# Patient Record
Sex: FEMALE | Race: WHITE
[De-identification: names, ages, dates, MRNs, and addresses within clinical notes are randomized per-mention and may not be internally consistent; named-entity substitution may affect disease eponyms.]

---

## 2022-07-23 NOTE — DIAGNOSTIC IMAGING REPORT
EXAMINATION: Right ankle 3 views.



HISTORY: Ankle injury.



COMPARISON: None available.



FINDINGS: There are displaced bimalleolar ankle fractures with

disruption of the ankle mortise. There is severe soft tissue

swelling. Small heel spur is present.



IMPRESSION: Displaced bimalleolar ankle fractures with disruption

of the ankle mortise. 



Dictated by: 



  Dictated on workstation # [id number]

## 2022-07-23 NOTE — ED LOWER EXTREMITY
General


Stated Complaint:  R FOOT PAIN


Source:  patient


Exam Limitations:  no limitations


 (AIDA FAUST)





History of Present Illness


Date Seen by Provider:  Jul 23, 2022


Time Seen by Provider:  17:48


Initial Comments


Patient is a 21-year-old female who presents ED with with right lateral ankle 

pain.  1 hour ago she was attempting to put on underwear bearing weight on her 

left leg at the time when she lost her balance landing on her right leg causing 

inward rotation of her ankle.  She heard immediate pop.  Has not been able to 

bear weight.  Obvious swelling noted to right lateral ankle.  Neurovascular 

intact.  Able to move her toes.  She took Tylenol right before arrival.  No 

history of previous fracture.  Denies hitting her head, back pain, chest pain, 

cough or shortness of breath.


 (AIDA FAUST)





Allergies and Home Medications


Allergies


Coded Allergies:  


     No Known Drug Allergies (Unverified , 7/23/22)





Patient Home Medication List


Home Medication List Reviewed:  Yes


 (AIDA FAUST)


Albuterol Sulfate (Proair Hfa) 1 Puff Puff, 2 PUFF IH Q4H, (Reported)


   Entered as Reported by: ROBERTO REDMOND on 7/25/22 1206


Hydrocodone/Acetaminophen (Hydrocodone-Acetamin 5-325 mg) 5 Mg-325 Mg Tablet, 1 

TAB PO Q4H PRN for PAIN-MODERATE (5-7)


   Prescribed by: LATOSHA GRIFFITHS on 7/23/22 1855


[Zoloft] Unknown Strength , Unknown Dose, (Reported)


   Entered as Reported by: ROBERTO REDMOND on 7/25/22 1206





Review of Systems


Constitutional:  No chills, No diaphoresis


EENTM:  No hearing loss, No ear pain, No blurred vision, No double vision


Respiratory:  No cough, No dyspnea on exertion


Cardiovascular:  No chest pain


Gastrointestinal:  No abdominal pain, No diarrhea, No nausea, No vomiting


Genitourinary:  No decreased output, No discharge


Musculoskeletal:  No back pain; joint pain, joint swelling


Skin:  change in color (AIDA FAUST)





All Other Systems Reviewed


Negative Unless Noted:  Yes


 (AIDA FAUST)





Physical Exam


Vital Signs





Vital Signs - First Documented








 7/23/22





 17:45


 


Temp 36.6


 


Pulse 76


 


Resp 18


 


B/P (MAP) 128/93 (105)


 


Pulse Ox 97


 


O2 Delivery Room Air








 (MAXIMILIANO KING MD)


Vital Signs


Capillary Refill :  


 (AIDA FAUST)


Height, Weight, BMI


Height: '"


Weight: lbs. oz. kg;  BMI


Method:


General Appearance:  WD/WN, no apparent distress


HEENT:  PERRL/EOMI, normal ENT inspection, TMs normal, pharynx normal


Neck:  non-tender, full range of motion, supple, normal inspection


Cardiovascular:  regular rate, rhythm, no edema, no gallop, no JVD


Respiratory:  chest non-tender, lungs clear, normal breath sounds, no 

respiratory distress


Gastrointestinal:  normal bowel sounds, non tender, soft, no organomegaly, no 

pulsatile mass


Back:  normal inspection, no CVA tenderness


Hips:  bilateral hip non-tender, bilateral hip normal inspection, bilateral hip 

normal range of motion, bilateral hip no evidence of injury


Legs:  bilateral leg non-tender, bilateral leg normal range of motion, bilateral

 leg no evidence of injury, bilateral leg abrasions


Knees:  bilateral knee non-tender, bilateral knee normal inspection, bilateral 

knee normal range of motion, bilateral knee no evidence of injury


Ankles:  right ankle pain, right ankle soft tissue tenderness, right ankle 

swelling


Neurologic/Psychiatric:  CNs II-XII nml as tested, no motor/sensory deficits, 

alert, normal mood/affect, oriented x 3


Skin:  other (Swelling to right lateral ankle.) (AIDA FAUST)





Procedures/Interventions


Splinting and Joint Reduction :  


   Pre-Proc Neuro Vasc Exam:  normal


   Post-Proc Neuro Vasc Exam:  normal


   Pre-Procedure NV Exam:  Yes


   post joint reduction film:  joint reduced


Progress


Short posterior leg and sugar-tong bulky splint right ankle.  Neurovascular 

intact.


   Ordered:  Crutches


   Hand-Made Type:  orthoglass


   Splint Application:  Short Leg


 (AIDA FAUST)





Progress/Results/Core Measures


Results/Orders


Lab Results





Laboratory Tests








Test


 7/23/22


18:03 Range/Units


 


 


Urine Color YELLOW   


 


Urine Clarity CLOUDY   


 


Urine pH 5.5  5-9  


 


Urine Specific Gravity >=1.030  1.016-1.022  


 


Urine Protein NEGATIVE  NEGATIVE  


 


Urine Glucose (UA) NEGATIVE  NEGATIVE  


 


Urine Ketones NEGATIVE  NEGATIVE  


 


Urine Nitrite NEGATIVE  NEGATIVE  


 


Urine Bilirubin NEGATIVE  NEGATIVE  


 


Urine Urobilinogen 0.2  < = 1.0  MG/DL


 


Urine Leukocyte Esterase NEGATIVE  NEGATIVE  


 


Urine RBC (Auto) NEGATIVE  NEGATIVE  


 


Urine RBC NONE   /HPF


 


Urine WBC 0-2   /HPF


 


Urine Squamous Epithelial


Cells 5-10 


  /HPF





 


Urine Crystals PRESENT H  /LPF


 


Urine Calcium Oxalate Crystals MODERATE H  /LPF


 


Urine Amorphous Sediment


 MOD DENNIS


URATES H  /LPF





 


Urine Bacteria TRACE   /HPF


 


Urine Casts NONE   /LPF


 


Urine Mucus SMALL H  /LPF


 


Urine Culture Indicated NO   


 


Urine Pregnancy Test NEGATIVE  NEGATIVE  





 (MAXIMILIANO KING MD)


Vital Signs/I&O











 7/23/22 7/23/22





 17:45 19:22


 


Temp 36.6 


 


Pulse 76 74


 


Resp 18 18


 


B/P (MAP) 128/93 (105) 119/87


 


Pulse Ox 97 97


 


O2 Delivery Room Air Room Air





 (MAXIMILIANO KING MD)





Departure


Communication (PCP)


Patient with a fall.  Denies hitting her head loss of conscious.  Swelling noted

 to the right ankle with subtle deformity.  Neuro vastly intact.  X-ray shows a 

displaced bimalleolar ankle fracture with disruption of the ankle mortise.  

Patient was given oral pain medication.  Patient was discussed with Dr. Schwab 

who offered inpatient and surgery tomorrow or follow-up in the office on Monday 

with surgery on Monday.  She agreed with surgery on Monday.  Pain controlled 

here.  Recommend placing in a splint.  Patient was placed in a posterior short 

leg and sugar-tong bulky splint.  Attempted to apply lateral pressure and help 

reduce the slight displacement fragments.  No evidence of compartment syndrome.m

 patient was provided crutches.  No weightbearing on that leg.  Recommend n.p.o.

 after midnight on Sunday.  Follow-up in the office at 830.  Surgery later that 

day and will stay the night.  She agrees with this plan of action.


 (AIDA FAUST)





Impression





   Primary Impression:  


   Ankle fracture


Disposition:  01 HOME, SELF-CARE


Condition:  Stable





Departure-Patient Inst.


Decision time for Depature:  18:51


 (AIDA FAUST)


Referrals:  


SCHWAB,TERRY D MD


Patient Instructions:  Ankle Fracture ED





Add. Discharge Instructions:  


Will discharge with pain medication.  Recommend elevate.  Crutches as provided. 

 Follow-up with Dr. Schwab on Monday 830 at his office.  Surgery that day will 

stay the night.  If any worsening pain to return back to ED for further 

evaluation.


Scripts


Hydrocodone/Acetaminophen (Hydrocodone-Acetamin 5-325 mg) 5 Mg-325 Mg Tablet


1 TAB PO Q4H PRN for PAIN-MODERATE (5-7), #20 TAB


   Prov: AIDA FAUST         7/23/22








ATTENDING PHYSICIAN NOTE:


I was physically present as attending physician in the emergency department 

during the care of this patient, but I was not directly involved in the decision

 making or delivery of care for this patient. 


 (MAXIMILIANO KING MD)











AIDA FAUST          Jul 23, 2022 17:50


MAXIMILIANO KING MD        Jul 25, 2022 14:14

## 2022-07-25 NOTE — DIAGNOSTIC IMAGING REPORT
INDICATION: 

Fluoroscopy utilized in Surgery.



FINDINGS: 

Three views show a sideplate along the distal fibula with bone

screws and anatomical realignment of the lateral malleolar

fracture. Two cannulated bone screws are in the medial malleolus

with anatomic alignment. The ankle mortise appears in good

alignment.



IMPRESSION:  

Fluoroscopy time reported in Surgery of 23 seconds.



Dictated by: 



  Dictated on workstation # [id number]

## 2022-07-25 NOTE — OPERATIVE REPORT - ORTHO
Operative Report


Surgeon (s)/Assistant (s)


Surgeon


TERRY SCHWAB MD


Assistant


n/a





Pre-Operative Diagnosis


Displaced bimalleolar fracture right ankle





Post-Operative Diagnosis


same





Operative Report


Date of Procedure:  Jul 25, 2022


Name of Procedure Performed:  


Open reduction internal fixation bimalleolar fracture right ankle with 2


4.L partially-threaded cannulated screws both 38 mm in length medial


malleolus and a 4-hole distal fibular locking plate laterally


Description & Findings


The patient was sent to preoperative area after being seen in the clinic.  Her 

right leg was marked.  She was given 2 g Ancef preop.   Her right leg was 

marked.  She was given 2 g Ancef preop.  She was brought to the operating room 

and placed on the OR table.  Her splint was removed from the right lower leg and

showed mild swelling with minimal bruising and no skin changes.  Tourniquet was 

placed on the right thigh.  The right foot and lower leg were She was brought to

the operating room and placed on the OR table.  Her splint was removed from the 

right lower leg and showed mild swelling with minimal bruising and no skin 

changes.  Tourniquet was placed on the right thigh.  The right foot and lower 

leg were prepped and draped in the usual sterile manner.  Again the patient was 

given Ancef 2 g IV preoperatively.


The leg was examined with an Esmarch and the tourniquet was elevated to 300 

mmHg.  A longitudinal incision was made over the distal fibula.  This was taken 

down through Cutaneoustissue.  Soft tissue was elevated off the distal fibula.  

The fracture was a very short oblique almost transverse fracture.  This was 

easily reduced and held with a reduction clamp.  Due to the orientation of the 

fracture no lag screw could be placed.  A 4-hole distal fibular locking plate 

was applied and held in place with the fracture reduced with K wires.  A an ini

tial screw was placed in one of the slots in the position of the plate was 

adjusted.  Plate was found to fit well.  At this point to locking screws were 

placed distally.  The reduction clamp was removed and image was used to 

visualize the fracture which was anatomic.  Good position of the plate on the 

fibula was noted.  Additional locking screws were placed distally with a total 

of 5 drilling measuring and inserting appropriate length screws.  All were 

unicortical due to the fact that the screws were at the joint line or below.  At

this point 3 bicortical screws were placed proximally leaving the most proximal 

screw hole open.  Image was again used to visualize the plate position and screw

position and the fracture remained anatomic with no issues with the plate or 

screws.


At this point an incision was made over the medial malleolus.  This was 

obliquely oriented.  This is taken down through subtenons tissue.  Saphenous 

vein was identified and retracted.  The fracture line was noted and the fracture

hematoma was irrigated.  The fracture was reduced and held with a reduction 

clamp.  Guidewires were then placed through the tip of the medial malleolus 

across fracture site into the distal tibia.  These are centrally located within 

the medial malleolus.  These were overdrilled through the cortex.  38 mm 4.0 

partially-threaded screws were then placed over the guidewires into the distal 

tibia.  Good purchase was noted.  The reduction clamp was removed.  The image 

was used to visualize the ankle and again excellent position alignment and no 

issues with the plate and screw laterally.  Excellent position of the screws 

medially and reduction of the medial malleolus.  The mortise was symmetrical.  

No widening of the syndesmosis.  At this point the syndesmosis was stressed us

ing a reduction clamp and lateral pull on the distal fibula.  No widening was 

noted of the syndesmosis.  





Permanent x-rays were obtained.He tourniquet was deflated after 45 minutes.  

Minimal bleeding was noted.  Small bleeders are cauterized.  The wounds were 

irrigated with normal saline.  The wounds were closed with oh, 2-0 Vicryl and 

skin comes laterally and 2-0 Vicryl and skin with skin clips medially.  The 

wounds were injected with a total of 50 mL of the 50-50 mixture of 2% Xylocaine 

with epinephrine and 0.25% Marcaine plain.  The wounds are dressed with 

antibiotic ointment, Adaptic, 4 x 4's and then wrapped with 4 inch Webril.  A 

posterior and sugar-tong splint were applied which were wrapped with Ace wraps. 

Patient had good capillary refill and pulses after the tourniquet was deflated.


Patient was then transferred to recovery room in good condition she tolerated 

procedure well.  She will be bit admitted overnight for observation, pain 

management and antibiotics.





AnesthesiaGeneral


[id number] minutes at 300 mmHg


Blood loss 50 mL


Replacementnone


Complicationsnone








n/a


Estimated Blood Loss


50 mLminimal


Packing


Nonenone.


Specimen(s) collected/removed


None











SCHWAB,TERRY D MD              Jul 25, 2022 13:48

## 2022-07-26 NOTE — PROGRESS NOTE - ORTHO
Progress Note


Subjective


Date of Exam


7/26/22


Chief Complaint


Bimalleolar fracture right ankle POD#1


HPI/Events since last exam


Luis Angel Is 1 day postop open reduction internal fixation bimalleolar fracture 

right ankle.  She has been up ambulating with physical therapy.  She had 

difficulty with the crutches but did better with the walker.  She is still 

having some pain and its worse when she gets up but she thinks she is ready to 

go home.  She has been taking hydrocodone 10/325 which is helping with the pain.


Review of Systems


Reviewed and no additions or changes


Allergies:  


Coded Allergies:  


     No Known Drug Allergies (Unverified , 7/23/22)


Home Meds


Active Scripts


Hydrocodone/Acetaminophen (Hydrocodone-Acetamin 5-325 mg) 5 Mg-325 Mg Tablet, 1 

TAB PO Q4H PRN for PAIN-MODERATE (5-7), #20 TAB


   Prov:AIDA FAUST         7/23/22


Reported Medications


[Zoloft] Unknown Strength  No Conflict Check


   7/25/22


Albuterol Sulfate (PROAIR HFA) 1 Puff Puff, 2 PUFF IH Q4H, EA


   1 PUFF = 90 MCG


   7/25/22





Objective


Exam


Constitutional: []


HEENT: []


Neck: []


Cardiovascular: []


Respiratory: []


Gastrointestinal: []


Genitourinary: []


Skin: []


Back/Spine: []


Extremities: []Splint is in good condition.  Normal sensation of the foot and 

toes with good cap refill.  She can move her toes without pain.


Neurologic: []


Psychiatric: []


Hematologic/lymphatic/immunologic: []


Vital Signs





Vital Signs








  Date Time  Temp Pulse Resp B/P (MAP) Pulse Ox O2 Delivery O2 Flow Rate FiO2


 


7/26/22 08:00     97 Nasal Cannula 2.00 


 


7/26/22 07:33 36.3 50 16 118/74 (89) 99 Room Air  


 


7/26/22 04:52 36.3 50 18 141/77 (98) 97 Room Air  


 


7/25/22 23:17 36.6 52 18 131/69 (89) 97 Nasal Cannula 2.00 


 


7/25/22 19:58     97 Nasal Cannula 2.00 


 


7/25/22 19:38 36.6 57 16 131/78 (95) 97 Nasal Cannula 2.00 


 


7/25/22 19:38 36.6 57 16 131/78 (95) 97 Nasal Cannula 2.00 


 


7/25/22 16:07 36.3 53 18 153/86 (108) 93 OxyMask 2.00 


 


7/25/22 16:05 36.3 53 18 153/86 (108) 93 OxyMask 2.00 


 


7/25/22 14:15      OxyMask 2.00 


 


7/25/22 14:15 36.7  18 136/82 (100) 94 OxyMask 2.00 


 


7/25/22 14:10      OxyMask 2.00 


 


7/25/22 14:10   18 142/85 (104) 95 OxyMask 2.00 


 


7/25/22 14:00      OxyMask 2.00 


 


7/25/22 14:00   18 141/83 (102) 93 OxyMask 2.00 


 


7/25/22 13:50   18 142/85 (104) 93 OxyMask 2.00 


 


7/25/22 13:45      OxyMask 2.00 


 


7/25/22 13:40   18 130/65 (86) 97 OxyMask 6.00 


 


7/25/22 13:30   18 131/58 (82) 96 OxyMask 6.00 


 


7/25/22 13:30      OxyMask 6.00 


 


7/25/22 13:24      OxyMask 6.00 


 


7/25/22 13:24 36.5  18 136/78 (97) 96 OxyMask 6.00 


 


7/25/22 10:25 36.1 74 20 128/80 (96) 98 Room Air  














I & O 


 


 7/26/22





 07:00


 


Intake Total 1480 ml


 


Output Total 1775 ml


 


Balance -295 ml








Lab Results


Laboratory Tests


7/25/22 11:10: 


White Blood Count 12.5H, Red Blood Count 4.79, Hemoglobin 13.4, Hematocrit 41, 

Mean Corpuscular Volume 86, Mean Corpuscular Hemoglobin 28, Mean Corpuscular 

Hemoglobin Concent 32, Red Cell Distribution Width 13.7, Platelet Count 265, 

Mean Platelet Volume 11.7, Immature Granulocyte % (Auto) 0, Neutrophils (%) 

(Auto) 64, Lymphocytes (%) (Auto) 26, Monocytes (%) (Auto) 7, Eosinophils (%) 

(Auto) 3, Basophils (%) (Auto) 0, Neutrophils # (Auto) 7.9H, Lymphocytes # 

(Auto) 3.2, Monocytes # (Auto) 0.9, Eosinophils # (Auto) 0.3, Basophils # (Auto)

 0.1, Immature Granulocyte # (Auto) 0.0, Sodium Level 140, Potassium Level 3.5L,

 Chloride Level 108H, Carbon Dioxide Level 21, Anion Gap 11, Blood Urea Nitrogen

 9, Creatinine 0.84, Estimat Glomerular Filtration Rate 100, BUN/Creatinine 

Ratio 11, Glucose Level 86, Calcium Level 9.1





Microbiology


7/25/22 MRSA Screen - Final, Complete


          MRSA not isolated





Assessment and Plan


Assessment


Doing well first day postop


Problem List


Bimalleolar fracture right ankle status post open reduction internal fixation


Plan


Discharge.  Follow-up Thursday for splint removal, dressing change and 

application of a nonweightbearing cast


Aspirin 325 mg daily for DVT prophylaxis


Elevation and ice right ankle


Walker ambulation nonweightbearing on the right


Prescription was provided for a folding walker


Hydrocodone 10/325 #40 was E scribed to the main [facility].  She will take 1 every

 4 hours as needed pain





Final Diagonsis


Closed Displaced bimalleolar fracture right ankle status post open reduction 

internal fixation


Level of the visit:  Level 3











SCHWAB,TERRY D MD              Jul 26, 2022 10:05

## 2022-07-26 NOTE — OPERATIVE REPORT - ORTHO
Operative Report


Surgeon (s)/Assistant (s)


Surgeon


TERRY SCHWAB MD


Assistant


n/a





Pre-Operative Diagnosis


BIMALLEOLAR FRACTURE RIGHT ANKLE





Post-Operative Diagnosis


same





Operative Report


Name of Procedure Performed:  


[facility] [address] [address]








Operative Report - Ortho





Patient Name: Luis Angel Deleon Number: [id number]


YOB: 2000Patient Status: Registered Clinic


Attending Doctor: Schwab,Terry D [facility] Number: [id number]





Operative Report


Operative Report


Surgeon (s)/Assistant (s)


Surgeon


TERRY SCHWAB MD


Assistant


n/a





Pre-Operative Diagnosis


Displaced bimalleolar fracture right ankle





Post-Operative Diagnosis


same





Operative Report


Date of Procedure:  Jul 25, 2022


Name of Procedure Performed:  


Open reduction internal fixation bimalleolar fracture right ankle with 2


4.L partially-threaded cannulated screws both 38 mm in length medial


malleolus and a 4-hole distal fibular locking plate laterally


Description & Findings


The patient was sent to preoperative area after being seen in the clinic.


Her right leg was marked.  She was given 2 g Ancef preop.   Her right leg


was marked.  She was given 2 g Ancef preop.  She was brought to the


operating room and placed on the OR table.  Her splint was removed from


the right lower leg and showed mild swelling with minimal bruising and no


skin changes.  Tourniquet was placed on the right thigh.  The right foot


and lower leg were She was brought to the operating room and placed on the


OR table.  Her splint was removed from the right lower leg and showed mild


swelling with minimal bruising and no skin changes.  Tourniquet was placed


on the right thigh.  The right foot and lower leg were prepped and draped


in the usual sterile manner.  Again the patient was given Ancef 2 g IV


preoperatively.


The leg was examined with an Esmarch and the tourniquet was elevated to


300 mmHg.  A longitudinal incision was made over the distal fibula.  This


was taken down through Cutaneoustissue.  Soft tissue was elevated off the


distal fibula.  The fracture was a very short oblique almost transverse


fracture.  This was easily reduced and held with a reduction clamp.  Due


to the orientation of the fracture no lag screw could be placed.  A 4-hole


distal fibular locking plate was applied and held in place with the


fracture reduced with K wires.  A an initial screw was placed in one of


the slots in the position of the plate was adjusted.  Plate was found to


fit well.  At this point to locking screws were placed distally.  The


reduction clamp was removed and image was used to visualize the fracture


which was anatomic.  Good position of the plate on the fibula was noted.


Additional locking screws were placed distally with a total of 5 drilling


measuring and inserting appropriate length screws.  All were unicortical


due to the fact that the screws were at the joint line or below.  At this


point 3 bicortical screws were placed proximally leaving the most proximal


screw hole open.  Image was again used to visualize the plate position and


screw position and the fracture remained anatomic with no issues with the


plate or screws.


At this point an incision was made over the medial malleolus.  This was


obliquely oriented.  This is taken down through subtenons tissue.


Saphenous vein was identified and retracted.  The fracture line was noted


and the fracture hematoma was irrigated.  The fracture was reduced and


held with a reduction clamp.  Guidewires were then placed through the tip


of the medial malleolus across fracture site into the distal tibia.  These


are centrally located within the medial malleolus.  These were overdrilled


through the cortex.  38 mm 4.0 partially-threaded screws were then placed


over the guidewires into the distal tibia.  Good purchase was noted.  The


reduction clamp was removed.  The image was used to visualize the ankle


and again excellent position alignment and no issues with the plate and


screw laterally.  Excellent position of the screws medially and reduction


of the medial malleolus.  The mortise was symmetrical.  No widening of the


syndesmosis.  At this point the syndesmosis was stressed using a reduction


clamp and lateral pull on the distal fibula.  No widening was noted of the


syndesmosis.





Permanent x-rays were obtained.He tourniquet was deflated after 45


minutes.  Minimal bleeding was noted.  Small bleeders are cauterized.  The


wounds were irrigated with normal saline.  The wounds were closed with oh,


2-0 Vicryl and skin comes laterally and 2-0 Vicryl and skin with skin


clips medially.  The wounds were injected with a total of 50 mL of the


50-50 mixture of 2% Xylocaine with epinephrine and 0.25% Marcaine plain.


The wounds are dressed with antibiotic ointment, Adaptic, 4 x 4's and then


wrapped with 4 inch Webril.  A posterior and sugar-tong splint were


applied which were wrapped with Ace wraps.  Patient had good capillary


refill and pulses after the tourniquet was deflated.


Patient was then transferred to recovery room in good condition she


tolerated procedure well.  She will be bit admitted overnight for


observation, pain management and antibiotics.





AnesthesiaGeneral


[id number] minutes at 300 mmHg


Blood loss 50 mL


Replacementnone


Complicationsnone








n/a


Estimated Blood Loss


50 mLminimal


Packing


Nonenone.


Specimen(s) collected/removed


None





Copy To:


Copy











SCHWAB,TERRY D MDl 25, 2022 13:48


Description & Findings


Description and Findings:See narrative above





n/a


Estimated Blood Loss


minimal


Packing


none.


Specimen(s) collected/removed


None











SCHWAB,TERRY D MD              Jul 26, 2022 10:08

## 2022-07-26 NOTE — ANESTHESIA-GENERAL POST-OP
General


Post Op Complications


Complications


None





Follow Up Care/Instructions


Patient Instructions


None needed.





Anesthesia/Patient Condition


Patient Condition


 No apparent adverse anesthesia problems in record.











READING,AMMON CASE CRNA          Jul 26, 2022 13:18

## 2022-07-26 NOTE — PHYSICAL THERAPY EVALUATION
PT Evaluation-General


Medical Diagnosis


Admission Date


2022


Medical Diagnosis:  right ankle fracture


Onset Date:  2022





Therapy Diagnosis


Therapy Diagnosis:  debility





Precautions


Precautions/Isolations:  Fall Prevention, Standard Precautions





Weight Bear Status


Right Lower Extremity:  Right


Non Weight Bearing


Left Lower Extremity:  Left


Full Weight Bearing





Referral


Physician:  Schwab


Reason for Referral:  Evaluation/Treatment





Medical History


Current History


ER secondary to LOB putting on clothing landing and twisting right ankle.


Reviewed History:  Yes





Social History


Home:  Single Level


Current Living Status:  Significant Other


Entry Into Home:  Stairs With Railing


PT Steps Into Home:  3





Prior


Prior Level of Function


SCALE: Activities may be completed with or without assistive devices.





6-Indepedent-patient completes the activity by him/herself with no assistance 

from a helper.


5-Set-up or Clean-up Assistance-helper sets up or cleans up; patient completes 

activity. [place] assists only prior to or  


    following the activity.


4-Supervision or Touching Assistance-helper provides verbal cues and/or 

touching/steadying and/or contact guard assistance as patient completes activity

. Assistance may be provided   


    throughout the activity or intermittently.


3-Partial/Moderate Assistance-helper does LESS THAN HALF the effort. [place] 

lifts, holds or supports trunk or limbs, but provides less than half the effort.


2-Substantial/Maximal Assistance-helper does MORE THAN HALF the effort. [place] 

lifts or holds trunk or limbs and provides more than half the effort.


6-[place]-tnmohw does ALL the effort. Patient does none of the effort to 

complete the activity. Or, the assistance of 2 or more helpers is required for 

the patient to complete the  


    activity.


If activity was not attempted, code reason:


7-Patient Refused.


9-Not Applicable-not attempted and the patient did not perform the activity 

before the current illness, exacerbation or injury.


10-Not Attempted due to Environmental Limitations-(lack of equipment, weather 

restraints, etc.).


88-Not Attempted due to Medical Conditions or Safety Concerns.


Bed Mobility:  6


Transfers (B,C,W/C):  6


Gait:  6


Stairs:  6


Indoor Mobility (Ambulation):  Independent


Stairs:  Independent


Prior Devices Use:  None





PT Evaluation-Current


Subjective


Patient is very emotional and voices frustration.  Declined crutch use and 

agrees to FWW use.





Objective


Patient Orientation:  Normal For Age





ROM/Strength


ROM Lower Extremities


bilateral LE WFL (right foot soft cast)


Strength Lower Extremities


left LE 4/5 grossly/right LE 4-/5





Integumentary/Posture


Integumentary


refer to nursing notes


Bowel Incontinence:  No


Bladder Incontinence:  No


Posture


WFL





Neuromuscular


(Tone, Coordination, Reflexes)


grossly intact





Sensory


Vision:  Functional


Hearing:  Functional





Transfers


Lying to Sitting/Side of Bed(Q:  6


Sit to Stand (QC):  4


Chair/Bed-to-Chair Xfer(QC):  4





Gait


Mode of Locomotion:  Walk


Anticipated Mode of Locomotion:  Walk


Walk 10 feet (QC):  4


Walk 50 ft with 2 Turns(QC):  4


Gait Assistive Device:  FWW


Comments/Gait Description


able to comply with NWB right LE with FWW





Balance


Sitting Static:  Normal


Sitting Dynamic:  Normal


Standing Static:  Fair


 Standing Dynamic:  Fair





Assessment/Needs


Patient reports she will go up and down steps at home on her bottom as she has 

been doing since the accident.  SW notified of need for bariatric FWW.


Rehab Potential:  Fair





PT Short Term Goals


Short Term Goals


Time Frame:  2022


Roll Left & Right:  6


Sit to lyin


Lying to sitting on side of be:  6


Sit to stand:  6


Chair/bed-to-chair transfer:  6


Toilet transfer:  6


Walk 10 feet:  6


Walk 50 feet with two turns:  6





PT Plan


Problem List


Problem List:  Activity Tolerance, Functional Strength, Safety, Balance, Gait, 

Transfer, Bed Mobility





Treatment/Plan


Treatment Plan:  Continue Plan of Care


Treatment Plan:  Bed Mobility, Education, Functional Activity Day, Functional 

Strength, Gait, Safety, Therapeutic Exercise, Transfers


Treatment Duration:  2022


Frequency:  3 times per week


Estimated Hrs Per Day:  .25 hour per day


Patient and/or Family Agrees t:  Yes





Safety Risks/Education


Patient Education:  Gait Training


Teaching Recipient:  Patient


Teaching Methods:  Demonstration


Response to Teaching:  Return Demonstration





Discharge Recommendations


Therapy Discharge Recommendati:  Home & Family





Time/GCodes


Time In:  850


Time Out:  904


Total Billed Treatment Time:  14


Total Billed Treatment


1 visit


EVModC 14 min











IMANI VANG PT              2022 10:53

## 2022-08-08 NOTE — DIAGNOSTIC IMAGING REPORT
Indication: Followup right ankle fracture.



Time of Exam: 9:29 AM



Correlation is made with prior radiograph from 07/23/2022.



Postop changes of the right ankle are noted. There is a lateral

plate and numerous screws transfixing the distal fibular

fracture. There are 2 partially threaded screws transfixing the

medial malleolar fracture. Overall alignment is anatomic.

Hardware is intact. The ankle mortise is well maintained. [place]

dome is smooth. There are medial and and lateral ankle skin

staples.



IMPRESSION: ORIF right ankle fractures, as described.



Dictated by: 



  Dictated on workstation # [id number]

## 2022-08-22 NOTE — DIAGNOSTIC IMAGING REPORT
INDICATION: Right ankle pain. Postop.



Comparison with 08/08/2022.



FINDINGS: 3 views. The side plate along the distal fibula as well

as bone screws in the medial malleolus remain in good position.

There is anatomical alignment of bimalleolar fracture. No callus

formation has developed. The articulating surfaces and joint

spaces are well-maintained.



IMPRESSION: Stable appearing ORIF of bimalleolar fracture.



Dictated by: 



  Dictated on workstation # [id number]

## 2022-09-07 ENCOUNTER — HOSPITAL ENCOUNTER (OUTPATIENT)
Dept: HOSPITAL 75 - ORTHO | Age: 22
End: 2022-09-07
Attending: ORTHOPAEDIC SURGERY
Payer: COMMERCIAL
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
## 2022-09-07 DIAGNOSIS — S82.841A: Primary | ICD-10-CM

## 2022-09-07 PROCEDURE — 73610 X-RAY EXAM OF ANKLE: CPT

## 2022-09-07 NOTE — DIAGNOSTIC IMAGING REPORT
INDICATION: Bimalleolar fracture 



EXAMINATION: Right ankle 09/07/2022



Comparison to 08/22/2022.



FINDINGS:



There is a sideplate with multiple screws along the distal

fibula. Distally threaded screw extends through the medial

malleolus. The orthopedic hardware appears intact. Fractures are

in good anatomic alignment. Incomplete healing of the medial

malleolus fracture is noted. The ankle mortise appears intact.

There is mild soft tissue swelling about the ankle. 



IMPRESSION: 

1. Uncomplicated postoperative changes.



Dictated by: 



  Dictated on workstation # [id number]

## 2022-09-21 ENCOUNTER — HOSPITAL ENCOUNTER (OUTPATIENT)
Dept: HOSPITAL 75 - ORTHO | Age: 22
End: 2022-09-21
Attending: ORTHOPAEDIC SURGERY
Payer: COMMERCIAL
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
## 2022-09-21 DIAGNOSIS — S82.841A: Primary | ICD-10-CM

## 2022-09-21 DIAGNOSIS — Z98.890: ICD-10-CM

## 2022-09-21 PROCEDURE — 73610 X-RAY EXAM OF ANKLE: CPT

## 2022-09-21 NOTE — DIAGNOSTIC IMAGING REPORT
CLINICAL HISTORY: Follow-up right ankle fracture.



COMPARISON: 09/07/2022.



TECHNIQUE: 3 views of the right ankle.



FINDINGS: 

Stable pos surgical changes of open reduction and internal

fixation of the distal right tibia and fibula for bimalleolar

fracture. There is anatomic alignment of the right ankle. No

evidence of hardware loosening or fracture. No new fractures are

identified.



IMPRESSION: 

1. Stable postsurgical changes of open reduction and internal

fixation of the bimalleolar fracture of the right ankle. No

malalignment or hardware complication.



Dictated by: 



  Dictated on workstation # [id number]

## 2022-09-30 ENCOUNTER — HOSPITAL ENCOUNTER (OUTPATIENT)
Dept: HOSPITAL 75 - REHAB | Age: 22
Discharge: HOME | End: 2022-09-30
Attending: ORTHOPAEDIC SURGERY
Payer: COMMERCIAL

## 2022-09-30 DIAGNOSIS — S82.841D: Primary | ICD-10-CM
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
## 2022-09-30 DIAGNOSIS — J45.909: ICD-10-CM

## 2022-10-12 ENCOUNTER — HOSPITAL ENCOUNTER (OUTPATIENT)
Dept: HOSPITAL 75 - ORTHO | Age: 22
End: 2022-10-12
Attending: ORTHOPAEDIC SURGERY
Payer: COMMERCIAL

## 2022-10-12 DIAGNOSIS — Z47.89: Primary | ICD-10-CM

## 2022-10-12 DIAGNOSIS — Z87.81: ICD-10-CM

## 2022-10-12 PROCEDURE — 73610 X-RAY EXAM OF ANKLE: CPT

## 2022-10-12 NOTE — DIAGNOSTIC IMAGING REPORT
INDICATION: 

Postop fracture. 



COMPARISON:  

09/21/2022. 



FINDINGS: 

Partially-threaded screws transfix the medial malleolus in

anatomic alignment. No residual fracture line. Plate and screws

transfix the distal fibula in anatomic alignment with no residual

or recurrent fracture. No hardware disruption. No adverse

development. 



IMPRESSION: 

Prior surgical changes with healed malleolar fractures and no

adverse development or acute pathology apparent.



Dictated by: 



  Dictated on workstation # [id number]

## 2022-10-25 ENCOUNTER — HOSPITAL ENCOUNTER (OUTPATIENT)
Dept: HOSPITAL 75 - REHAB | Age: 22
LOS: 6 days | Discharge: HOME | End: 2022-10-31
Attending: ORTHOPAEDIC SURGERY
Payer: COMMERCIAL
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
## 2022-10-25 DIAGNOSIS — S82.841D: Primary | ICD-10-CM

## 2022-10-25 DIAGNOSIS — J45.909: ICD-10-CM
